# Patient Record
Sex: MALE | Race: WHITE | Employment: FULL TIME | URBAN - METROPOLITAN AREA
[De-identification: names, ages, dates, MRNs, and addresses within clinical notes are randomized per-mention and may not be internally consistent; named-entity substitution may affect disease eponyms.]

---

## 2018-04-16 ENCOUNTER — HOSPITAL ENCOUNTER (EMERGENCY)
Facility: CLINIC | Age: 53
Discharge: HOME | End: 2018-04-16
Attending: NURSE PRACTITIONER | Admitting: NURSE PRACTITIONER
Payer: COMMERCIAL

## 2018-04-16 VITALS
BODY MASS INDEX: 29.12 KG/M2 | OXYGEN SATURATION: 95 % | WEIGHT: 215 LBS | DIASTOLIC BLOOD PRESSURE: 92 MMHG | HEIGHT: 72 IN | SYSTOLIC BLOOD PRESSURE: 147 MMHG | HEART RATE: 68 BPM | RESPIRATION RATE: 20 BRPM | TEMPERATURE: 98 F

## 2018-04-16 DIAGNOSIS — M54.50 ACUTE BILATERAL LOW BACK PAIN WITHOUT SCIATICA: ICD-10-CM
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
## 2018-04-16 PROCEDURE — 99282 EMERGENCY DEPT VISIT SF MDM: CPT

## 2018-04-16 RX ORDER — METHOCARBAMOL 500 MG/1
1000 TABLET, FILM COATED ORAL 3 TIMES DAILY PRN
Qty: 30 TABLET | Refills: 1 | Status: SHIPPED | OUTPATIENT
Start: 2018-04-16 | End: 2019-07-26

## 2018-04-16 ASSESSMENT — ENCOUNTER SYMPTOMS
BACK PAIN: 1
NUMBNESS: 0
ABDOMINAL PAIN: 0
MYALGIAS: 1
SHORTNESS OF BREATH: 0
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
## 2018-04-16 NOTE — ED AVS SNAPSHOT
Emergency Department    [address] [place] [address]    Phone:  [phone]    Fax:  [phone]                                       Meghan Sam   MRN: [id number]    Department:   Emergency Department   Date of Visit:  4/16/2018           After Visit Summary Signature Page     I have received my discharge instructions, and my questions have been answered. I have discussed any challenges I see with this plan with the nurse or doctor.    ..........................................................................................................................................  Patient/Patient Representative Signature      ..........................................................................................................................................  Patient Representative Print Name and Relationship to Patient    ..................................................               ................................................  Date                                            Time    ..........................................................................................................................................  Reviewed by Signature/Title    ...................................................              ..............................................  Date                                                            Time
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
## 2019-07-26 ENCOUNTER — HOSPITAL ENCOUNTER (EMERGENCY)
Facility: CLINIC | Age: 54
Discharge: HOME | End: 2019-07-26
Attending: EMERGENCY MEDICINE | Admitting: EMERGENCY MEDICINE

## 2019-07-26 VITALS
DIASTOLIC BLOOD PRESSURE: 94 MMHG | OXYGEN SATURATION: 96 % | HEART RATE: 53 BPM | SYSTOLIC BLOOD PRESSURE: 141 MMHG | RESPIRATION RATE: 17 BRPM | TEMPERATURE: 97.9 F

## 2019-07-26 DIAGNOSIS — R55 NEAR SYNCOPE: ICD-10-CM

## 2019-07-26 LAB
ANION GAP SERPL CALCULATED.3IONS-SCNC: 6 MMOL/L (ref 3–14)
BASOPHILS # BLD AUTO: 0.1 10E9/L (ref 0–0.2)
BASOPHILS NFR BLD AUTO: 0.6 %
BUN SERPL-MCNC: 12 MG/DL (ref 7–30)
CALCIUM SERPL-MCNC: 8.8 MG/DL (ref 8.5–10.1)
CHLORIDE SERPL-SCNC: 109 MMOL/L (ref 94–109)
CO2 SERPL-SCNC: 27 MMOL/L (ref 20–32)
CREAT SERPL-MCNC: 0.94 MG/DL (ref 0.66–1.25)
DIFFERENTIAL METHOD BLD: ABNORMAL
EOSINOPHIL # BLD AUTO: 0.4 10E9/L (ref 0–0.7)
EOSINOPHIL NFR BLD AUTO: 3.4 %
ERYTHROCYTE [DISTWIDTH] IN BLOOD BY AUTOMATED COUNT: 11.8 % (ref 10–15)
GFR SERPL CREATININE-BSD FRML MDRD: >90 ML/MIN/{1.73_M2}
GLUCOSE SERPL-MCNC: 101 MG/DL (ref 70–99)
HCT VFR BLD AUTO: 48.2 % (ref 40–53)
HGB BLD-MCNC: 16.7 G/DL (ref 13.3–17.7)
IMM GRANULOCYTES # BLD: 0.1 10E9/L (ref 0–0.4)
IMM GRANULOCYTES NFR BLD: 0.4 %
LYMPHOCYTES # BLD AUTO: 2.9 10E9/L (ref 0.8–5.3)
LYMPHOCYTES NFR BLD AUTO: 22.9 %
MCH RBC QN AUTO: 32.4 PG (ref 26.5–33)
MCHC RBC AUTO-ENTMCNC: 34.6 G/DL (ref 31.5–36.5)
MCV RBC AUTO: 93 FL (ref 78–100)
MONOCYTES # BLD AUTO: 0.8 10E9/L (ref 0–1.3)
MONOCYTES NFR BLD AUTO: 6.2 %
NEUTROPHILS # BLD AUTO: 8.3 10E9/L (ref 1.6–8.3)
NEUTROPHILS NFR BLD AUTO: 66.5 %
NRBC # BLD AUTO: 0 10*3/UL
NRBC BLD AUTO-RTO: 0 /100
PLATELET # BLD AUTO: 219 10E9/L (ref 150–450)
POTASSIUM SERPL-SCNC: 3.4 MMOL/L (ref 3.4–5.3)
RBC # BLD AUTO: 5.16 10E12/L (ref 4.4–5.9)
SODIUM SERPL-SCNC: 142 MMOL/L (ref 133–144)
WBC # BLD AUTO: 12.5 10E9/L (ref 4–11)

## 2019-07-26 PROCEDURE — 80048 BASIC METABOLIC PNL TOTAL CA: CPT | Performed by: EMERGENCY MEDICINE

## 2019-07-26 PROCEDURE — 93005 ELECTROCARDIOGRAM TRACING: CPT

## 2019-07-26 PROCEDURE — 85025 COMPLETE CBC W/AUTO DIFF WBC: CPT | Performed by: EMERGENCY MEDICINE

## 2019-07-26 PROCEDURE — 96361 HYDRATE IV INFUSION ADD-ON: CPT

## 2019-07-26 PROCEDURE — 99284 EMERGENCY DEPT VISIT MOD MDM: CPT | Mod: 25

## 2019-07-26 PROCEDURE — 25800030 ZZH RX IP 258 OP 636: Performed by: EMERGENCY MEDICINE

## 2019-07-26 PROCEDURE — 96360 HYDRATION IV INFUSION INIT: CPT

## 2019-07-26 RX ADMIN — SODIUM CHLORIDE, POTASSIUM CHLORIDE, SODIUM LACTATE AND CALCIUM CHLORIDE 1000 ML: 600; 310; 30; 20 INJECTION, SOLUTION INTRAVENOUS at 18:44

## 2019-07-26 ASSESSMENT — ENCOUNTER SYMPTOMS
TREMORS: 1
NAUSEA: 0
VOMITING: 0
LIGHT-HEADEDNESS: 1
SHORTNESS OF BREATH: 0
PALPITATIONS: 0
HEADACHES: 0

## 2019-07-26 NOTE — LETTER
July 26, 2019      To Whom It May Concern:      Meghan Sam was seen in our Emergency Department today, 07/26/19.  I expect his condition to improve over the next day.  He may return to work when improved.    Sincerely,        Romeo Laura MD

## 2019-07-26 NOTE — ED NOTES
Bed: ED14  Expected date: 7/26/19  Expected time: 6:02 PM  Means of arrival: Ambulance  Comments:  Sejal Dang

## 2019-07-26 NOTE — ED TRIAGE NOTES
"Pt arrives via EMS for three near-syncopal episodes today.  Pt states \"he never did have LOC, but had to go down to his knees to prevent from passing out.\" Pt denies any SOB, chest pain, n/v, or any significant health hx. ABCs in-tact. VSS.  "

## 2019-07-26 NOTE — ED AVS SNAPSHOT
[facility] Emergency Department  [address]  [facility] [address]  Phone:  [phone]  Fax:  [phone]                                    Meghan Sam   MRN: [id number]    Department:  [facility] Emergency Department   Date of Visit:  7/26/2019           After Visit Summary Signature Page    I have received my discharge instructions, and my questions have been answered. I have discussed any challenges I see with this plan with the nurse or doctor.    ..........................................................................................................................................  Patient/Patient Representative Signature      ..........................................................................................................................................  Patient Representative Print Name and Relationship to Patient    ..................................................               ................................................  Date                                   Time    ..........................................................................................................................................  Reviewed by Signature/Title    ...................................................              ..............................................  Date                                               Time          22EPIC Rev 08/18

## 2019-07-26 NOTE — ED PROVIDER NOTES
"  History     Chief Complaint:  Near Syncope    HPI   Meghan Sam is a 54 year old male who presents to the emergency department for evaluation of near syncope. The patient reports he was at work today when he had 3 episodes of lightheadedness and near syncope spaced several minutes apart. He notes [place] and today was moving up and down ladders frequently. The patient indicates that following the 3rd episode of near syncope, he fell to his knees for several minutes, but he had no complete syncope, head trauma, or LOC. He denies any injuries or pain. He notified people at work regarding these symptoms and EMS was contacted. The patient denies any chemical or fume exposure today, as well as any chest pain, palpitations, shortness of breath, headache, vision changes, leg swelling, rash, nausea, or vomiting. He notes he has been mildly tremulous since the incident. He states he has had no similar episodes in the past. The patient remarks he has been mildly congested recently but has had no other recent illness. He now feels back to \"normal.\" No other preceding symptoms.     Allergies:  NKDA     Medications:    The patient is currently on no regular medications.     Past Medical History:    Palpitations     Past Surgical History:    The patient does not have any pertinent past surgical history.    Family History:    Diabetes  MI  ITP    Social History:  Presents alone.  Current every day smoker, 1 ppd.  Positive for alcohol use.   Marital Status:   [2]     Review of Systems   Eyes: Negative for visual disturbance.   Respiratory: Negative for shortness of breath.    Cardiovascular: Negative for chest pain, palpitations and leg swelling.   Gastrointestinal: Negative for nausea and vomiting.   Neurological: Positive for tremors and light-headedness. Negative for syncope and headaches.        Near syncope   All other systems reviewed and are negative.      Physical Exam "     Patient Vitals for the past 24 hrs:   BP Temp Temp src Pulse Heart Rate Resp SpO2   07/26/19 2000 (!) 140/100 -- -- 55 55 11 97 %   07/26/19 1945 (!) 135/93 -- -- 56 67 10 98 %   07/26/19 1935 -- 97.9  F (36.6  C) Oral -- -- -- --   07/26/19 1930 131/90 -- -- 53 55 17 93 %   07/26/19 1915 (!) 135/93 -- -- -- 71 18 95 %   07/26/19 1900 136/84 -- -- 63 63 16 94 %   07/26/19 1845 (!) 137/94 -- -- 72 73 11 96 %   07/26/19 1830 142/89 -- -- 76 73 19 96 %     Physical Exam  General: Well appearing, nontoxic. Resting comfortably  Head:  Scalp, face, and head appear normal  Eyes:  Pupils are equal, round, and reactive to light, EOMI, no nystagmus     Conjunctivae non-injected and sclerae white  ENT:    The external nose is normal    Pinnae are normal    The oropharynx is normal, mucous membranes moist    Uvula is in the midline  Neck:  Normal range of motion    There is no rigidity noted    Trachea is in the midline  CV:  Regular rate and rhythm     Normal S1/S2, no S3/S4    No murmur or rub. Radial pulses 2+ bilaterally   Resp:  Lungs are clear and equal bilaterally    There is no tachypnea    No increased work of breathing    No rales, wheezing, or rhonchi  GI:  Abdomen is soft, no rigidity or guarding    No distension, or mass    No tenderness or rebound tenderness   MS:  Normal muscular tone    Symmetric motor strength    No lower extremity edema  Skin:  No rash or acute skin lesions noted  Neuro: A&Ox3, GCS 15    CN II - XII intact    Speech clear, fluent, and normal    Strength 5/5 and symmetric in bilateral upper and lower extremities.    No pronator drift. No leg drift. SILT throughout.    No ankle clonus    FTN testing normal. No tremor.     Gait normal    No meningismus   Psych:  Normal affect.  Appropriate interactions.      Emergency Department Course   ECG:  Time: 1821  Vent. Rate 72 bpm. SC interval 162. QRS duration 92. QT/QTc 398/435. P-R-T axis 63 31 52.  Normal sinus rhythm.  Normal ECG.  Read time:  1829    Laboratory:  CBC: WBC: 12.5 (H), HGB: 16.7, PLT: 219  BMP: Glucose 101 (H), o/w WNL (Creatinine: 0.94)    Interventions:  1844 Lactated ringers bolus 1000 mL IV    Emergency Department Course:  Nursing notes and vitals reviewed. 1825 I performed an exam of the patient as documented above.     EKG obtained in the ED, see results above.     IV inserted. Medicine administered as documented above. Blood drawn. This was sent to the lab for further testing, results above.    2019 I rechecked the patient and discussed the results of his workup thus far.     Findings and plan explained to the Patient. Patient discharged home with instructions regarding supportive care, medications, and reasons to return. The importance of close follow-up was reviewed.     I personally reviewed the laboratory results with the Patient and answered all related questions prior to discharge.    Impression & Plan      Medical Decision Making:  Meghan Sam is a 54 year old male who presents for evaluation of near-syncope.  They did not have actual syncope.  The differential for near-syncope is broad and includes etiologies such as cardiac arrythmia, ACS, aortic stenosis, HOCM, PE, orthostatic hypotension, drugs, situational, carotid hypersensitivity, seizure, TIA, stroke, vasovagal.  There are no signs of a concerning etiology for near- syncope at this point.  In addition, there is no chest pain, no seizure activity or post-ictal period, no murmur, and no signs of orthostasis in the ED, no focal neurologic symptoms, and no complaints of concerning headache.  The workup in the ED is negative and the physical exam is re-assurring.  Supportive outpatient management is therefore indicated. Close PCP follow up recommended for any recurrent symptoms. Return precautions were discussed with patient. The patient's questions were answered and the patient was agreeable with discharge.     Diagnosis:    ICD-10-CM   1. Near syncope R55        Disposition:  discharged to home    I, Kyle Jacob, am serving as a scribe on 7/26/2019 at 6:17 PM to personally document services performed by Romeo Laura MD based on my observations and the provider's statements to me.     Kyle Jacob  7/26/2019   [facility] EMERGENCY DEPARTMENT       Romeo Laura MD  07/27/19 [id number]

## 2019-07-29 LAB — INTERPRETATION ECG - MUSE: NORMAL

## 2021-05-27 ENCOUNTER — HOSPITAL ENCOUNTER (EMERGENCY)
Facility: CLINIC | Age: 56
Discharge: HOME | End: 2021-05-27
Attending: EMERGENCY MEDICINE | Admitting: EMERGENCY MEDICINE
Payer: COMMERCIAL

## 2021-05-27 VITALS
HEIGHT: 70 IN | OXYGEN SATURATION: 96 % | HEART RATE: 51 BPM | BODY MASS INDEX: 25.77 KG/M2 | WEIGHT: 180 LBS | TEMPERATURE: 97.6 F | DIASTOLIC BLOOD PRESSURE: 90 MMHG | SYSTOLIC BLOOD PRESSURE: 127 MMHG | RESPIRATION RATE: 60 BRPM

## 2021-05-27 DIAGNOSIS — R07.9 CHEST PAIN, UNSPECIFIED TYPE: ICD-10-CM

## 2021-05-27 DIAGNOSIS — R00.2 PALPITATIONS: ICD-10-CM

## 2021-05-27 LAB
ANION GAP SERPL CALCULATED.3IONS-SCNC: 5 MMOL/L (ref 3–14)
BASOPHILS # BLD AUTO: 0.1 10E9/L (ref 0–0.2)
BASOPHILS NFR BLD AUTO: 1.2 %
BUN SERPL-MCNC: 17 MG/DL (ref 7–30)
CALCIUM SERPL-MCNC: 8.8 MG/DL (ref 8.5–10.1)
CHLORIDE SERPL-SCNC: 113 MMOL/L (ref 94–109)
CO2 SERPL-SCNC: 23 MMOL/L (ref 20–32)
CREAT SERPL-MCNC: 0.92 MG/DL (ref 0.66–1.25)
DIFFERENTIAL METHOD BLD: NORMAL
EOSINOPHIL # BLD AUTO: 0.3 10E9/L (ref 0–0.7)
EOSINOPHIL NFR BLD AUTO: 3.9 %
ERYTHROCYTE [DISTWIDTH] IN BLOOD BY AUTOMATED COUNT: 11.7 % (ref 10–15)
GFR SERPL CREATININE-BSD FRML MDRD: >90 ML/MIN/{1.73_M2}
GLUCOSE SERPL-MCNC: 115 MG/DL (ref 70–99)
HCT VFR BLD AUTO: 47.4 % (ref 40–53)
HGB BLD-MCNC: 16.6 G/DL (ref 13.3–17.7)
IMM GRANULOCYTES # BLD: 0 10E9/L (ref 0–0.4)
IMM GRANULOCYTES NFR BLD: 0.3 %
INTERPRETATION ECG - MUSE: NORMAL
LYMPHOCYTES # BLD AUTO: 2.8 10E9/L (ref 0.8–5.3)
LYMPHOCYTES NFR BLD AUTO: 31.4 %
MCH RBC QN AUTO: 31.5 PG (ref 26.5–33)
MCHC RBC AUTO-ENTMCNC: 35 G/DL (ref 31.5–36.5)
MCV RBC AUTO: 90 FL (ref 78–100)
MONOCYTES # BLD AUTO: 0.7 10E9/L (ref 0–1.3)
MONOCYTES NFR BLD AUTO: 8 %
NEUTROPHILS # BLD AUTO: 4.9 10E9/L (ref 1.6–8.3)
NEUTROPHILS NFR BLD AUTO: 55.2 %
NRBC # BLD AUTO: 0 10*3/UL
NRBC BLD AUTO-RTO: 0 /100
PLATELET # BLD AUTO: 250 10E9/L (ref 150–450)
POTASSIUM SERPL-SCNC: 4.1 MMOL/L (ref 3.4–5.3)
RBC # BLD AUTO: 5.27 10E12/L (ref 4.4–5.9)
SODIUM SERPL-SCNC: 141 MMOL/L (ref 133–144)
TROPONIN I SERPL-MCNC: <0.015 UG/L (ref 0–0.04)
TROPONIN I SERPL-MCNC: <0.015 UG/L (ref 0–0.04)
WBC # BLD AUTO: 8.8 10E9/L (ref 4–11)

## 2021-05-27 PROCEDURE — 85025 COMPLETE CBC W/AUTO DIFF WBC: CPT | Performed by: EMERGENCY MEDICINE

## 2021-05-27 PROCEDURE — 93005 ELECTROCARDIOGRAM TRACING: CPT

## 2021-05-27 PROCEDURE — 84484 ASSAY OF TROPONIN QUANT: CPT | Performed by: EMERGENCY MEDICINE

## 2021-05-27 PROCEDURE — 80048 BASIC METABOLIC PNL TOTAL CA: CPT | Performed by: EMERGENCY MEDICINE

## 2021-05-27 PROCEDURE — 99284 EMERGENCY DEPT VISIT MOD MDM: CPT | Mod: 25

## 2021-05-27 ASSESSMENT — ENCOUNTER SYMPTOMS
DIAPHORESIS: 0
TREMORS: 1
SHORTNESS OF BREATH: 1

## 2021-05-27 ASSESSMENT — MIFFLIN-ST. JEOR: SCORE: 1654.78

## 2021-05-27 NOTE — ED TRIAGE NOTES
"History of heart palpitations were \"quick\" but this one was a \"big thump\". Had 2 \"big thumps\" in a 30 minutes.  "

## 2021-05-27 NOTE — ED PROVIDER NOTES
"  History   Chief Complaint:  Palpitations       The history is provided by the patient.      Meghan GOODRICH Beager is a 56 year old male who presents with left sided chest pain and tremors. At 1320, the patient was driving to work and had two episodes of chest palpitations with associated tremors that lasted a couple minutes. He also mentioned he had a little episode while waiting in the ED lobby. He mentions mild shortness of breath as well. He notes chest palpitations in the past but this is a new feeling today. He denies any diaphoresis or swelling of the legs. He reports he is a smoker and generally has a poor diet. Of note, he had a major weight loss of about 40 pounds and travels to from [place] to [place]. Denies any surgery.     Review of Systems   Constitutional: Negative for diaphoresis.   Respiratory: Positive for shortness of breath.    Cardiovascular: Positive for chest pain. Negative for leg swelling.   Neurological: Positive for tremors.   All other systems reviewed and are negative.      Allergies:  The patient has no known allergies.     Medications:  The patient is currently on no regular medications.    Past Medical History:    Palpitations    Past Surgical History:    The patient denies past surgical history.     Family History:    Mother: Blood disease  Father: Diabetes, MI    Social History:  The patient presents alone.   He is a smoker.  Alcohol use: Negative    Physical Exam     Patient Vitals for the past 24 hrs:   BP Temp Temp src Pulse Resp SpO2 Height Weight   05/27/21 1830 (!) 127/90 -- -- 51 (!) 60 96 % -- --   05/27/21 1800 (!) 127/94 -- -- 50 9 96 % -- --   05/27/21 1730 (!) 127/98 -- -- (!) 49 (!) 0 96 % -- --   05/27/21 1417 138/83 97.6  F (36.4  C) Temporal 60 18 97 % 1.765 m (5' 9.5\") 81.6 kg (180 lb)       Physical Exam  Eyes:  The pupils are equal and round    Conjunctivae and sclerae are normal  ENT:    The nose is normal    Pinnae are normal  CV:  Regular rate and rhythm "     No edema  Resp:  Lungs are clear    Non-labored    No rales    No wheezing   GI:  Abdomen is soft, there is no rigidity    No distension    No rebound tenderness   MS:  Normal muscular tone    No asymmetric leg swelling  Skin:  No rash or acute skin lesions noted  Neuro:   Awake, alert.      Speech is normal and fluent.    Face is symmetric.     Moves all extremities    Emergency Department Course   ECG  ECG taken at 1424, ECG read at 1730  Sinus bradycardia   Rate 53 bpm. NC interval 148 ms. QRS duration 86 ms. QT/QTc 418/392 ms. P-R-T axes 50 45 44.     Laboratory:  CBC: WBC 8.8, HGB 16.6,      BMP: Chloride: 113 (H), Glucose: 115 (H) o/w WNL (Creatinine 0.92)     Troponin (Collected 1424): <0.015    Troponin (Collected 1812): <0.015    Emergency Department Course:    Reviewed:  I reviewed nursing notes, vitals, past medical history and care everywhere    Assessments:  1730 I obtained history and examined the patient as noted above.     1927 Updated and rechecked the patient.     Disposition:  The patient was discharged to home.     Impression & Plan     Medical Decision Making:  Meghan Sam is a 56 year old male who presents the emergency department with chest pain and palpitations.  He notes that this started while he was driving today and lasted for a couple of minutes.  He also had a slight episode of palpitations without chest pain while waiting in the lobby.  He has had palpitations many times in the past but they have never been captured on any EKG or monitor.  He does report that he has previously done a Holter.  Here his EKG shows a sinus bradycardia without any acute ischemic changes.  His CBC and BMP are normal.  Troponin testing was performed and was undetectable.  Given the duration of time between onset of his symptoms and his presentation, a second troponin was obtained and found also to be undetectable.  He has no known cardiac disease.  No family history of heart disease.  Here his  oxygenation is normal.  He has no shortness of breath currently although he did have a brief episode while he was having the palpitations.  I doubt pulmonary embolism based on his symptoms without any leg swelling or other risk factors.  He has a low heart score.  He felt comfortable with plan for discharge.  Encouraged him to follow-up with his PCP.  He was given an order for follow-up for outpatient cardiac monitoring as well as cardiology follow-up.  He is to return with any new or worrisome symptoms.      Diagnosis:    ICD-10-CM    1. Palpitations  R00.2 Follow-Up with Cardiologist     Leadless EKG Monitor 3 to 14 Days   2. Chest pain, unspecified type  R07.9 Follow-Up with Cardiologist     Leadless EKG Monitor 3 to 14 Days       Scribe Disclosure:  I, Ant Wallace, am serving as a scribe at 5:25 PM on 5/27/2021 to document services personally performed by Jesu Snowden MD based on my observations and the provider's statements to me.     I, Hebert Rivera, am serving as a scribe  at 9:43 PM on 5/27/2021 to document services personally performed by Jesu Snowden MD based on my observations and the provider's statements to me.            Jesu Snowden MD  05/27/21 [id number]

## 2021-06-07 ENCOUNTER — HOSPITAL ENCOUNTER (OUTPATIENT)
Dept: CARDIOLOGY | Facility: CLINIC | Age: 56
Discharge: HOME | End: 2021-06-07
Attending: EMERGENCY MEDICINE | Admitting: EMERGENCY MEDICINE
Payer: COMMERCIAL

## 2021-06-07 DIAGNOSIS — R00.2 PALPITATIONS: ICD-10-CM

## 2021-06-07 DIAGNOSIS — R07.9 CHEST PAIN, UNSPECIFIED TYPE: ICD-10-CM

## 2021-06-07 PROCEDURE — 93242 EXT ECG>48HR<7D RECORDING: CPT

## 2021-06-07 PROCEDURE — 93244 EXT ECG>48HR<7D REV&INTERPJ: CPT | Performed by: INTERNAL MEDICINE

## 2023-05-03 ENCOUNTER — OFFICE VISIT (OUTPATIENT)
Dept: FAMILY MEDICINE | Facility: CLINIC | Age: 58
End: 2023-05-03
Payer: COMMERCIAL

## 2023-05-03 ENCOUNTER — ANCILLARY PROCEDURE (OUTPATIENT)
Dept: GENERAL RADIOLOGY | Facility: CLINIC | Age: 58
End: 2023-05-03
Payer: COMMERCIAL

## 2023-05-03 ENCOUNTER — LAB (OUTPATIENT)
Dept: FAMILY MEDICINE | Facility: CLINIC | Age: 58
End: 2023-05-03

## 2023-05-03 VITALS
WEIGHT: 212.1 LBS | RESPIRATION RATE: 16 BRPM | BODY MASS INDEX: 30.37 KG/M2 | HEART RATE: 64 BPM | HEIGHT: 70 IN | OXYGEN SATURATION: 95 % | DIASTOLIC BLOOD PRESSURE: 84 MMHG | SYSTOLIC BLOOD PRESSURE: 138 MMHG | TEMPERATURE: 98.4 F

## 2023-05-03 DIAGNOSIS — Z11.59 NEED FOR HEPATITIS C SCREENING TEST: ICD-10-CM

## 2023-05-03 DIAGNOSIS — Z12.11 SCREEN FOR COLON CANCER: ICD-10-CM

## 2023-05-03 DIAGNOSIS — M25.561 ACUTE PAIN OF RIGHT KNEE: ICD-10-CM

## 2023-05-03 DIAGNOSIS — I10 HYPERTENSION, UNSPECIFIED TYPE: ICD-10-CM

## 2023-05-03 DIAGNOSIS — Z13.220 SCREENING FOR HYPERLIPIDEMIA: ICD-10-CM

## 2023-05-03 DIAGNOSIS — Z11.4 SCREENING FOR HIV (HUMAN IMMUNODEFICIENCY VIRUS): ICD-10-CM

## 2023-05-03 DIAGNOSIS — M25.561 ACUTE PAIN OF RIGHT KNEE: Primary | ICD-10-CM

## 2023-05-03 LAB
ANION GAP SERPL CALCULATED.3IONS-SCNC: 12 MMOL/L (ref 7–15)
BUN SERPL-MCNC: 19.4 MG/DL (ref 6–20)
CALCIUM SERPL-MCNC: 9.5 MG/DL (ref 8.6–10)
CHLORIDE SERPL-SCNC: 107 MMOL/L (ref 98–107)
CHOLEST SERPL-MCNC: 192 MG/DL
CREAT SERPL-MCNC: 0.96 MG/DL (ref 0.67–1.17)
DEPRECATED HCO3 PLAS-SCNC: 23 MMOL/L (ref 22–29)
GFR SERPL CREATININE-BSD FRML MDRD: >90 ML/MIN/1.73M2
GLUCOSE SERPL-MCNC: 102 MG/DL (ref 70–99)
HBA1C MFR BLD: 6 % (ref 0–5.6)
HCV AB SERPL QL IA: NONREACTIVE
HDLC SERPL-MCNC: 39 MG/DL
HIV 1+2 AB+HIV1 P24 AG SERPL QL IA: NONREACTIVE
LDLC SERPL CALC-MCNC: 124 MG/DL
NONHDLC SERPL-MCNC: 153 MG/DL
POTASSIUM SERPL-SCNC: 4.5 MMOL/L (ref 3.4–5.3)
SODIUM SERPL-SCNC: 142 MMOL/L (ref 136–145)
TRIGL SERPL-MCNC: 146 MG/DL

## 2023-05-03 PROCEDURE — 73562 X-RAY EXAM OF KNEE 3: CPT | Mod: TC | Performed by: RADIOLOGY

## 2023-05-03 PROCEDURE — 86803 HEPATITIS C AB TEST: CPT

## 2023-05-03 PROCEDURE — 99203 OFFICE O/P NEW LOW 30 MIN: CPT

## 2023-05-03 PROCEDURE — 87389 HIV-1 AG W/HIV-1&-2 AB AG IA: CPT

## 2023-05-03 PROCEDURE — 36415 COLL VENOUS BLD VENIPUNCTURE: CPT

## 2023-05-03 PROCEDURE — 80048 BASIC METABOLIC PNL TOTAL CA: CPT

## 2023-05-03 PROCEDURE — 83036 HEMOGLOBIN GLYCOSYLATED A1C: CPT

## 2023-05-03 PROCEDURE — 80061 LIPID PANEL: CPT

## 2023-05-03 NOTE — PROGRESS NOTES
"  Assessment & Plan     Acute pain of right knee  Pain for 3 weeks since he was unable to \"pop\" his knee as usual.  He has difficulty walking due to pain today and is set up with crutches.  X-ray of right knee and referral to Ortho for further management.  Discussed potential for physical therapy once cleared by x-ray.  Also discussed future potential for joint injections and possible surgical management.  Patient with no known history of arthritis, no previous history with this knee.  Has been wearing a knee brace.  Advised to rest and use ice as well.  Pain elicited with internal rotation of right foot.  No pain with flexion and extension passively, no point tenderness.  No obvious signs of deformity no swelling.  - XR Knee Right 3 Views; Future  - Orthopedic  Referral; Future  - CRUTCHES, UNDERARM WOOD    Hypertension, unspecified type  No known history of hypertension, blood pressure elevated in clinic today.  Order placed for home blood pressure cuff and if not covered patient to buy one OTC.  Patient instructed she will check her blood pressure and if more than 3 elevated blood pressures to return to clinic.  Discussed diet and exercise modifications, significant other reports he is 30 pounds overweight and often has issues with high blood pressure when he is overweight.  - Basic metabolic panel  (Ca, Cl, CO2, Creat, Gluc, K, Na, BUN); Future  - Home Blood Pressure Monitor Order for DME - ONLY FOR DME  - Basic metabolic panel  (Ca, Cl, CO2, Creat, Gluc, K, Na, BUN)    Screen for colon cancer  Declines colonoscopy but is amenable to Cologuard screening.  Discussed follow-up needed should this test to be positive.  Patient informed he will be notified of positive results via telephone and negative results via letter.  - COLOGUARD(EXACT SCIENCES); Future    Screening for HIV (human immunodeficiency virus)  Amenable to one-time HIV screening.  No known risk factors.  - HIV Antigen Antibody Combo; " "Future  - Hemoglobin A1c; Future  - HIV Antigen Antibody Combo  - Hemoglobin A1c    Need for hepatitis C screening test  Amenable to one-time hepatitis C screening.  No known risk factors.  - Hepatitis C Screen Reflex to HCV RNA Quant and Genotype; Future  - Hepatitis C Screen Reflex to HCV RNA Quant and Genotype    Screening for hyperlipidemia  No known personal or familial history of hyperlipidemia.  No known previous screening.  - Lipid panel reflex to direct LDL Non-fasting; Future  - Lipid panel reflex to direct LDL Non-fasting    Father did have diabetes, patient has not been told has diabetes in the past.  He is amenable to screening for high blood sugar today.  He has eaten and is not fasting.  Order placed for hemoglobin A1c.  We discussed diet and exercise should he be found to have diabetes and additional measures that may be recommended should he be found to have diabetes.             Nicotine/Tobacco Cessation:  He reports that he has been smoking cigarettes. He has a 28.00 pack-year smoking history. He has never used smokeless tobacco.  Nicotine/Tobacco Cessation Plan:   Information offered: Patient not interested at this time      BMI:   Estimated body mass index is 30.87 kg/m  as calculated from the following:    Height as of this encounter: 1.765 m (5' 9.5\").    Weight as of this encounter: 96.2 kg (212 lb 1.6 oz).   Weight management plan: Discussed healthy diet and exercise guidelines        NAVDEEP Tao CNP [facility]    Rajat Christianson is a 58 year old, presenting for the following health issues:  Establish Care and Knee Pain (Right knee x 3 weeks. No known injury, but had the sensation like knee needed to be popped.)        5/3/2023    10:08 AM   Additional Questions   Roomed by Rose   Accompanied by Girlfriend Constanza Christianson is a 58-year-old male ambulatory to clinic with a limp accompanied by his significant other.  He reports 3 weeks ago was " "unable to pop knee like he is usually able to. Gets pain with walking and if he sits too long. Has been using a brace, with mild relief. Ibuprofen with mild relief. No known history of arthritis. CBD oil does seem to help. First step in the am is very painful.  He reports pain radiates to left foot.        Knee Pain    History of Present Illness       Reason for visit:  Knee pain  Symptom onset:  3-4 weeks ago    He eats 0-1 servings of fruits and vegetables daily.He consumes 3 sweetened beverage(s) daily.He exercises with enough effort to increase his heart rate 9 or less minutes per day.  He exercises with enough effort to increase his heart rate 4 days per week.   He is taking medications regularly.       Pain History:  When did you first notice your pain? 3 weeks ago   Have you seen anyone else for your pain? No  How has your pain affected your ability to work? Yes  Where in your body do you have pain? Musculoskeletal problem/pain  Onset/Duration: 6 weeks  Description  Location: knee - right  Joint Swelling: YES  Redness: No  Pain: YES  Warmth: No  Intensity:  Moderate to severe  Progression of Symptoms:  worsening  Accompanying signs and symptoms:   Fevers: No  Numbness/tingling/weakness: YES  History  Trauma to the area: No  Recent illness:  No  Previous similar problem: No  Previous evaluation:  No  Precipitating or alleviating factors:  Aggravating factors include: standing, walking, climbing stairs and overuse  Therapies tried and outcome: Ibuprofen, Knee brace and CBD            Review of Systems   Constitutional, HEENT, cardiovascular, pulmonary, gi and gu systems are negative, except as otherwise noted.      Objective    BP (!) 146/66 (BP Location: Right arm, Patient Position: Sitting, Cuff Size: Adult Large)   Pulse 64   Temp 98.4  F (36.9  C) (Oral)   Resp 16   Ht 1.765 m (5' 9.5\")   Wt 96.2 kg (212 lb 1.6 oz)   SpO2 95%   BMI 30.87 kg/m    Body mass index is 30.87 kg/m .  Physical Exam "   GENERAL: healthy, alert and no distress  NECK: no adenopathy, no asymmetry, masses, or scars and thyroid normal to palpation  RESP: lungs clear to auscultation - no rales, rhonchi or wheezes  CV: regular rate and rhythm, normal S1 S2, no S3 or S4, no murmur, click or rub, no peripheral edema and peripheral pulses strong  ABDOMEN: soft, nontender, no hepatosplenomegaly, no masses and bowel sounds normal  MS: no gross musculoskeletal defects noted, no edema  MS: normal muscle tone, no edema, peripheral pulses normal and RLE exam shows no deformities and no point tenderness.  No pain with anterior and posterior drawer test.  Knee pain is elicited with anterior rotation of right foot.  Passive flexion and extension of knee do not elicit pain.

## 2023-05-05 ENCOUNTER — TELEPHONE (OUTPATIENT)
Dept: FAMILY MEDICINE | Facility: CLINIC | Age: 58
End: 2023-05-05
Payer: COMMERCIAL

## 2023-05-05 DIAGNOSIS — R73.03 PREDIABETES: Primary | ICD-10-CM

## 2023-05-05 NOTE — TELEPHONE ENCOUNTER
Order/Referral Request    Who is requesting: Patient     Orders being requested: Has some questions about his referral from last office visit on 5/3 and the results of his Xray    Reason service is needed/diagnosis: Right Knee Pain    Has this been discussed with Provider: Yes    Does patient have a preference on a Group/Provider/Facility? No    Okay to leave a detailed message?: Yes at home number on file:    Telephone Information:   Mobile [phone]

## 2023-05-05 NOTE — TELEPHONE ENCOUNTER
Patient called to relay results of normal knee xray. Patient informed that his lab work shows he has prediabetes.  Other lab work reviewed with patient and is within expected range and is amenable to receiving diabetes education and nutrition counseling.  Order placed.    Patient also given number to call for Ortho referral as he missed a call from someone in healthcare and does not have the after visit summary was given to call back.    NAVDEEP Tao CNP on 5/5/2023 at 5:04 PM

## 2024-07-29 RX ORDER — LOSARTAN POTASSIUM 25 MG/1
1 TABLET ORAL DAILY
COMMUNITY
Start: 2024-07-25 | End: 2024-07-30

## 2024-07-29 RX ORDER — OXYCODONE HYDROCHLORIDE 5 MG/1
2.5-5 TABLET ORAL EVERY 4 HOURS PRN
COMMUNITY
Start: 2024-07-25 | End: 2024-07-30

## 2024-07-29 RX ORDER — ACETAMINOPHEN 500 MG
1000 TABLET ORAL EVERY 6 HOURS PRN
COMMUNITY
Start: 2024-07-25

## 2024-07-30 ENCOUNTER — ORDERS ONLY (AUTO-RELEASED) (OUTPATIENT)
Dept: FAMILY MEDICINE | Facility: CLINIC | Age: 59
End: 2024-07-30

## 2024-07-30 ENCOUNTER — OFFICE VISIT (OUTPATIENT)
Dept: FAMILY MEDICINE | Facility: CLINIC | Age: 59
End: 2024-07-30
Payer: COMMERCIAL

## 2024-07-30 VITALS
HEIGHT: 70 IN | SYSTOLIC BLOOD PRESSURE: 132 MMHG | TEMPERATURE: 98.3 F | BODY MASS INDEX: 28.55 KG/M2 | HEART RATE: 61 BPM | OXYGEN SATURATION: 96 % | DIASTOLIC BLOOD PRESSURE: 76 MMHG | RESPIRATION RATE: 16 BRPM | WEIGHT: 199.4 LBS

## 2024-07-30 DIAGNOSIS — Z12.11 SCREEN FOR COLON CANCER: ICD-10-CM

## 2024-07-30 DIAGNOSIS — Z95.0 CARDIAC PACEMAKER IN SITU: ICD-10-CM

## 2024-07-30 DIAGNOSIS — Z71.6 ENCOUNTER FOR TOBACCO USE CESSATION COUNSELING: ICD-10-CM

## 2024-07-30 DIAGNOSIS — Z12.11 SCREEN FOR COLON CANCER: Primary | ICD-10-CM

## 2024-07-30 DIAGNOSIS — I10 HYPERTENSION, UNSPECIFIED TYPE: ICD-10-CM

## 2024-07-30 PROCEDURE — G2211 COMPLEX E/M VISIT ADD ON: HCPCS | Performed by: NURSE PRACTITIONER

## 2024-07-30 PROCEDURE — 99213 OFFICE O/P EST LOW 20 MIN: CPT | Performed by: NURSE PRACTITIONER

## 2024-07-30 RX ORDER — VARENICLINE TARTRATE 0.5 (11)-1
KIT ORAL
Qty: 53 TABLET | Refills: 0 | Status: SHIPPED | OUTPATIENT
Start: 2024-07-30

## 2024-07-30 RX ORDER — LOSARTAN POTASSIUM 25 MG/1
25 TABLET ORAL DAILY
Qty: 90 TABLET | Refills: 0 | Status: SHIPPED | OUTPATIENT
Start: 2024-07-30

## 2024-07-30 NOTE — PROGRESS NOTES
"  Assessment & Plan     (Z12.11) Screen for colon cancer  (primary encounter diagnosis)  Comment:   Plan: COLOGUARD(EXACT SCIENCES)            (I10) Hypertension, unspecified type  Comment:   Plan: losartan (COZAAR) 25 MG tablet        Controlled, med refilled    (Z71.6) Encounter for tobacco use cessation counseling  Comment:   Plan: varenicline (CHANTIX TIM) 0.5 MG X 11 & 1 MG X         42 tablet            (Z95.0) Cardiac pacemaker in situ  Comment:   Plan:   The longitudinal plan of care for the diagnosis(es)/condition(s) as documented were addressed during this visit. Due to the added complexity in care, I will continue to support Meghan in the subsequent management and with ongoing continuity of care.          MED REC REQUIRED  Post Medication Reconciliation Status: discharge medications reconciled and changed, per note/orders  Nicotine/Tobacco Cessation  He reports that he has been smoking cigarettes. He has a 28 pack-year smoking history. He has never used smokeless tobacco.  Nicotine/Tobacco Cessation Plan  Information offered: Patient not interested at this time  Pharmacotherapies : varenicline (Chantix)        BMI  Estimated body mass index is 29.02 kg/m  as calculated from the following:    Height as of this encounter: 1.765 m (5' 9.5\").    Weight as of this encounter: 90.4 kg (199 lb 6.4 oz).             Rajat Christianson is a 59 year old, presenting for the following health issues: patient is here for follow up visit after hospital stay, patient was given pacemaker, has been feeling ok since being home    [facility] notes reviewed reveal:  Echocardiogram un remarkable  Troponin negative  EKG sinus satish prior to pacer placement    Smoker since age 20 year, would be willing to consider Chantix, educated on use/risk/benefits    Willing to try cologuard for screening    Hospital F/U        7/30/2024     8:57 AM   Additional Questions   Roomed by fabian velazquez   Accompanied by [facility] " "Follow-up Visit:    Hospital/Nursing Home/IP Rehab Facility:  [facility]  Date of Admission: 07/22/2024  Date of Discharge: 07/25/2024  Reason(s) for Admission: bradycardia, was just not feeling well  Was the patient in the ICU or did the patient experience delirium during hospitalization?  No  Do you have any other stressors you would like to discuss with your provider? No    Problems taking medications regularly:  None  Medication changes since discharge: started in Losartan and Acetaminophen  Problems adhering to non-medication therapy:  None    Summary of hospitalization:  CareEverywhere information obtained and reviewed  Diagnostic Tests/Treatments reviewed.  Follow up needed: see arediology tomorrow for dressing removal  Has lexiscan set up with [facility] in 6 weeks  Other Healthcare Providers Involved in Patient s Care:         None  Update since discharge: improved.         Plan of care communicated with patient and family                     Objective    /76 (BP Location: Right arm, Patient Position: Sitting)   Pulse 61   Temp 98.3  F (36.8  C)   Resp 16   Ht 1.765 m (5' 9.5\")   Wt 90.4 kg (199 lb 6.4 oz)   SpO2 96%   BMI 29.02 kg/m    Body mass index is 29.02 kg/m .  Physical Exam   GENERAL: alert and no distress  NECK: no adenopathy, no asymmetry, masses, or scars  RESP: lungs clear to auscultation - no rales, rhonchi or wheezes  CV: regular rate and rhythm, normal S1 S2, no S3 or S4, no murmur, click or rub, no peripheral edema  MS: no gross musculoskeletal defects noted, no edema  Dressing intact left chest wall, no drainage            Signed Electronically by: Piper Khan NP    "

## 2024-07-30 NOTE — LETTER
July 30, 2024      Meghan Sam   [facility] [address]        To Whom It May Concern:    Meghan Sam was seen in our clinic. He may return to work with the following: can not lift left arm over his head for 4 more weeks, no heavy lifting for that time frame as well.      Sincerely,      Piper Khan

## 2024-12-10 DIAGNOSIS — I10 HYPERTENSION, UNSPECIFIED TYPE: ICD-10-CM

## 2024-12-11 RX ORDER — LOSARTAN POTASSIUM 25 MG/1
25 TABLET ORAL DAILY
Qty: 90 TABLET | Refills: 0 | Status: SHIPPED | OUTPATIENT
Start: 2024-12-11

## 2025-04-08 DIAGNOSIS — I10 HYPERTENSION, UNSPECIFIED TYPE: ICD-10-CM

## 2025-04-08 RX ORDER — LOSARTAN POTASSIUM 25 MG/1
25 TABLET ORAL DAILY
Qty: 90 TABLET | Refills: 0 | Status: SHIPPED | OUTPATIENT
Start: 2025-04-08

## 2025-04-24 ENCOUNTER — ORDERS ONLY (AUTO-RELEASED) (OUTPATIENT)
Dept: FAMILY MEDICINE | Facility: CLINIC | Age: 60
End: 2025-04-24

## 2025-04-24 ENCOUNTER — OFFICE VISIT (OUTPATIENT)
Dept: FAMILY MEDICINE | Facility: CLINIC | Age: 60
End: 2025-04-24
Payer: COMMERCIAL

## 2025-04-24 VITALS
SYSTOLIC BLOOD PRESSURE: 128 MMHG | HEART RATE: 63 BPM | HEIGHT: 70 IN | DIASTOLIC BLOOD PRESSURE: 80 MMHG | OXYGEN SATURATION: 98 % | WEIGHT: 206 LBS | BODY MASS INDEX: 29.49 KG/M2 | TEMPERATURE: 97.5 F | RESPIRATION RATE: 20 BRPM

## 2025-04-24 DIAGNOSIS — Z00.00 ROUTINE GENERAL MEDICAL EXAMINATION AT A HEALTH CARE FACILITY: Primary | ICD-10-CM

## 2025-04-24 DIAGNOSIS — Z12.11 SCREEN FOR COLON CANCER: ICD-10-CM

## 2025-04-24 DIAGNOSIS — Z95.0 CARDIAC PACEMAKER IN SITU: ICD-10-CM

## 2025-04-24 DIAGNOSIS — I10 HYPERTENSION, UNSPECIFIED TYPE: ICD-10-CM

## 2025-04-24 DIAGNOSIS — Z13.1 SCREENING FOR DIABETES MELLITUS: ICD-10-CM

## 2025-04-24 LAB
ANION GAP SERPL CALCULATED.3IONS-SCNC: 12 MMOL/L (ref 7–15)
BUN SERPL-MCNC: 16.3 MG/DL (ref 6–20)
CALCIUM SERPL-MCNC: 9.3 MG/DL (ref 8.8–10.4)
CHLORIDE SERPL-SCNC: 106 MMOL/L (ref 98–107)
CREAT SERPL-MCNC: 0.95 MG/DL (ref 0.67–1.17)
EGFRCR SERPLBLD CKD-EPI 2021: >90 ML/MIN/1.73M2
EST. AVERAGE GLUCOSE BLD GHB EST-MCNC: 128 MG/DL
GLUCOSE SERPL-MCNC: 105 MG/DL (ref 70–99)
HBA1C MFR BLD: 6.1 % (ref 0–5.6)
HCO3 SERPL-SCNC: 24 MMOL/L (ref 22–29)
POTASSIUM SERPL-SCNC: 4.5 MMOL/L (ref 3.4–5.3)
SODIUM SERPL-SCNC: 142 MMOL/L (ref 135–145)

## 2025-04-24 PROCEDURE — 80048 BASIC METABOLIC PNL TOTAL CA: CPT | Performed by: NURSE PRACTITIONER

## 2025-04-24 RX ORDER — LOSARTAN POTASSIUM 25 MG/1
25 TABLET ORAL DAILY
Qty: 90 TABLET | Refills: 3 | Status: SHIPPED | OUTPATIENT
Start: 2025-04-24

## 2025-04-24 SDOH — HEALTH STABILITY: PHYSICAL HEALTH: ON AVERAGE, HOW MANY MINUTES DO YOU ENGAGE IN EXERCISE AT THIS LEVEL?: 0 MIN

## 2025-04-24 SDOH — HEALTH STABILITY: PHYSICAL HEALTH: ON AVERAGE, HOW MANY DAYS PER WEEK DO YOU ENGAGE IN MODERATE TO STRENUOUS EXERCISE (LIKE A BRISK WALK)?: 1 DAY

## 2025-04-24 ASSESSMENT — SOCIAL DETERMINANTS OF HEALTH (SDOH): HOW OFTEN DO YOU GET TOGETHER WITH FRIENDS OR RELATIVES?: NEVER

## 2025-04-24 NOTE — PATIENT INSTRUCTIONS
Patient Education   Preventive Care Advice   This is general advice given by our system to help you stay healthy. However, your care team may have specific advice just for you. Please talk to your care team about your preventive care needs.  Nutrition  Eat 5 or more servings of fruits and vegetables each day.  Try wheat bread, brown rice and whole grain pasta (instead of white bread, rice, and pasta).  Get enough calcium and vitamin D. Check the label on foods and aim for 100% of the RDA (recommended daily allowance).  Lifestyle  Exercise at least 150 minutes each week  (30 minutes a day, 5 days a week).  Do muscle strengthening activities 2 days a week. These help control your weight and prevent disease.  No smoking.  Wear sunscreen to prevent skin cancer.  Have a dental exam and cleaning every 6 months.  Yearly exams  See your health care team every year to talk about:  Any changes in your health.  Any medicines your care team has prescribed.  Preventive care, family planning, and ways to prevent chronic diseases.  Shots (vaccines)   HPV shots (up to age 26), if you've never had them before.  Hepatitis B shots (up to age 59), if you've never had them before.  COVID-19 shot: Get this shot when it's due.  Flu shot: Get a flu shot every year.  Tetanus shot: Get a tetanus shot every 10 years.  Pneumococcal, hepatitis A, and RSV shots: Ask your care team if you need these based on your risk.  Shingles shot (for age 50 and up)  General health tests  Diabetes screening:  Starting at age 35, Get screened for diabetes at least every 3 years.  If you are younger than age 35, ask your care team if you should be screened for diabetes.  Cholesterol test: At age 39, start having a cholesterol test every 5 years, or more often if advised.  Bone density scan (DEXA): At age 50, ask your care team if you should have this scan for osteoporosis (brittle bones).  Hepatitis C: Get tested at least once in your life.  STIs (sexually  transmitted infections)  Before age 24: Ask your care team if you should be screened for STIs.  After age 24: Get screened for STIs if you're at risk. You are at risk for STIs (including HIV) if:  You are sexually active with more than one person.  You don't use condoms every time.  You or a partner was diagnosed with a sexually transmitted infection.  If you are at risk for HIV, ask about PrEP medicine to prevent HIV.  Get tested for HIV at least once in your life, whether you are at risk for HIV or not.  Cancer screening tests  Cervical cancer screening: If you have a cervix, begin getting regular cervical cancer screening tests starting at age 21.  Breast cancer scan (mammogram): If you've ever had breasts, begin having regular mammograms starting at age 40. This is a scan to check for breast cancer.  Colon cancer screening: It is important to start screening for colon cancer at age 45.  Have a colonoscopy test every 10 years (or more often if you're at risk) Or, ask your provider about stool tests like a FIT test every year or Cologuard test every 3 years.  To learn more about your testing options, visit:   .  For help making a decision, visit:   https://bit.ly/ft28393.  Prostate cancer screening test: If you have a prostate, ask your care team if a prostate cancer screening test (PSA) at age 55 is right for you.  Lung cancer screening: If you are a current or former smoker ages 50 to 80, ask your care team if ongoing lung cancer screenings are right for you.  For informational purposes only. Not to replace the advice of your health care provider. Copyright   2023 [facility]. All rights reserved. Clinically reviewed by the [facility] Transitions Program. [employer] 246622 - REV 01/24.  Relationships for Good Health  Relationships are important for our health and happiness. Social isolation, loneliness and lack of support are bad for your health. Studies show that loneliness can harm health  and limit your life span as much as high blood pressure and smoking.   Take some time to reflect on your relationships. Then answer these questions:  Are there people in your life that cause you stress or drain your energy? What can you do to set limits?  ________________________________________________________________________________________________________________________________________________________________________________________________________________________________________________________________________________________________________________________________________________  Who do you enjoy spending time with? Who can you go to for support?  ________________________________________________________________________________________________________________________________________________________________________________________________________________________________________________________________________________________________________________________________________________  What can you do to improve your relationships with others?  __________________________________________________________________________________________________________________________________________________________________________________________________________________  ______________________________________________________________________________________________________________________________  What do you like most about your relationships with others?  ________________________________________________________________________________________________________________________________________________________________________________________________________________________________________________________________________________________________________________________________________________  My goal: ______________________________________________________________________  I will:  ______________________________________________________________________________________________________________________________________________________________________________________________    For informational purposes only. Not to replace the advice of your health care provider. Copyright   2018 [facility]. All rights reserved. Clinically reviewed by Bariatric Health  Team. [employer] 506255 - Rev 06/24.  Learning About Stress  What is stress?     Stress is your body's response to a hard situation. Your body can have a physical, emotional, or mental response. Stress is a fact of life for most people, and it affects everyone differently. What causes stress for you may not be stressful for someone else.  A lot of things can cause stress. You may feel stress when you go on a job interview, take a test, or run a race. This kind of short-term stress is normal and even useful. It can help you if you need to work hard or react quickly. For example, stress can help you finish an important job on time.  Long-term stress is caused by ongoing stressful situations or events. Examples of long-term stress include long-term health problems, ongoing problems at work, or conflicts in your family. Long-term stress can harm your health.  How does stress affect your health?  When you are stressed, your body responds as though you are in danger. It makes hormones that speed up your heart, make you breathe faster, and give you a burst of energy. This is called the fight-or-flight stress response. If the stress is over quickly, your body goes back to normal and no harm is done.  But if stress happens too often or lasts too long, it can have bad effects. Long-term stress can make you more likely to get sick, and it can make symptoms of some diseases worse. If you tense up when you are stressed, you may develop neck, shoulder, or low back pain. Stress is linked to high blood pressure and heart disease.  Stress also  harms your emotional health. It can make you tovar, tense, or depressed. Your relationships may suffer, and you may not do well at work or school.  What can you do to manage stress?  You can try these things to help manage stress:   Do something active. Exercise or activity can help reduce stress. Walking is a great way to get started. Even everyday activities such as housecleaning or yard work can help.  Try yoga or raam chi. These techniques combine exercise and meditation. You may need some training at first to learn them.  Do something you enjoy. For example, listen to music or go to a movie. Practice your hobby or do volunteer work.  Meditate. This can help you relax, because you are not worrying about what happened before or what may happen in the future.  Do guided imagery. Imagine yourself in any setting that helps you feel calm. You can use online videos, books, or a teacher to guide you.  Do breathing exercises. For example:  From a standing position, bend forward from the waist with your knees slightly bent. Let your arms dangle close to the floor.  Breathe in slowly and deeply as you return to a standing position. Roll up slowly and lift your head last.  Hold your breath for just a few seconds in the standing position.  Breathe out slowly and bend forward from the waist.  Let your feelings out. Talk, laugh, cry, and express anger when you need to. Talking with supportive friends or family, a counselor, or a hemant leader about your feelings is a healthy way to relieve stress. Avoid discussing your feelings with people who make you feel worse.  Write. It may help to write about things that are bothering you. This helps you find out how much stress you feel and what is causing it. When you know this, you can find better ways to cope.  What can you do to prevent stress?  You might try some of these things to help prevent stress:  Manage your time. This helps you find time to do the things you want and need to  "do.  Get enough sleep. Your body recovers from the stresses of the day while you are sleeping.  Get support. Your family, friends, and community can make a difference in how you experience stress.  Limit your news feed. Avoid or limit time on social media or news that may make you feel stressed.  Do something active. Exercise or activity can help reduce stress. Walking is a great way to get started.  Where can you learn more?  Go to https://www.[employer].net/patiented  Enter N032 in the search box to learn more about \"Learning About Stress.\"  Current as of: October 24, 2024  Content Version: 14.4    5022-8707 "[employer].   Care instructions adapted under license by your healthcare professional. If you have questions about a medical condition or this instruction, always ask your healthcare professional. "[employer] disclaims any warranty or liability for your use of this information.       "

## 2025-04-24 NOTE — PROGRESS NOTES
"Preventive Care Visit  [facility]  Piper Khan NP, Family Medicine  Apr 24, 2025      Assessment & Plan     (Z00.00) Routine general medical examination at a health care facility  (primary encounter diagnosis)  Comment:   Plan:     (I10) Hypertension, unspecified type  Comment:   Plan: losartan (COZAAR) 25 MG tablet, Basic metabolic        panel  (Ca, Cl, CO2, Creat, Gluc, K, Na, BUN)        Stable    Counseled regarding smoking cessation, he is not interested at this time, last visit was counseled and prescribed Chantix, never started it. 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    Weight as of this encounter: 93.4 kg (206 lb).       Counseling  Appropriate preventive services were addressed with this patient via screening, questionnaire, or discussion as appropriate for fall prevention, nutrition, physical activity, Tobacco-use cessation, social engagement, weight loss and cognition.  Checklist reviewing preventive services available has been given to the patient.  Reviewed patient's diet, addressing concerns and/or questions.   He is at risk for lack of exercise and has been provided with information to increase physical activity for the benefit of his well-being.   Patient is at risk for social isolation and has been provided with information about the benefit of social connection.   The patient was instructed to see the dentist every 6 " months.   He is at risk for psychosocial distress and has been provided with information to reduce risk.           Rajat Christianson is a 59 year old, presenting for the following:    Pacemaker placed last summer.  Still smoking 1/2 ppd  Physical (Recheck and renew meds)        4/24/2025    10:34 AM   Additional Questions   Roomed by ARLETTE Pyle   Accompanied by Self          HPI  Needs med refills         Advance Care Planning            4/24/2025   General Health   How would you rate your overall physical health? Good   Feel stress (tense, anxious, or unable to sleep) To some extent   (!) STRESS CONCERN      4/24/2025   Nutrition   Three or more servings of calcium each day? (!) I DON'T KNOW   Diet: Breakfast skipped   How many servings of fruit and vegetables per day? (!) 0-1   How many sweetened beverages each day? (!) 4+         4/24/2025   Exercise   Days per week of moderate/strenous exercise 1 day   Average minutes spent exercising at this level 0 min   (!) EXERCISE CONCERN      4/24/2025   Social Factors   Frequency of gathering with friends or relatives Never   Worry food won't last until get money to buy more No   Food not last or not have enough money for food? No   Do you have housing? (Housing is defined as stable permanent housing and does not include staying outside in a car, in a tent, in an abandoned building, in an overnight shelter, or couch-surfing.) No   Are you worried about losing your housing? No   Lack of transportation? No   Unable to get utilities (heat,electricity)? No   Want help with housing or utility concern? No   (!) HOUSING CONCERN PRESENT(!) SOCIAL CONNECTIONS CONCERN      4/24/2025   Fall Risk   Fallen 2 or more times in the past year? No   Trouble with walking or balance? No          4/24/2025   Dental   Dentist two times every year? (!) NO         Today's PHQ-2 Score:       4/24/2025    10:39 AM   PHQ-2 ( 1999 [employer])   Q1: Little interest or pleasure in doing things 0   Q2:  "Feeling down, depressed or hopeless 0   PHQ-2 Score 0    Q1: Little interest or pleasure in doing things Not at all   Q2: Feeling down, depressed or hopeless Not at all   PHQ-2 Score 0       Patient-reported           4/24/2025   Substance Use   Alcohol more than 3/day or more than 7/wk Not Applicable   Do you use any other substances recreationally? No     Social History     Tobacco Use    Smoking status: Every Day     Current packs/day: 1.00     Average packs/day: 1 pack/day for 28.0 years (28.0 ttl pk-yrs)     Types: Cigarettes    Smokeless tobacco: Never   Vaping Use    Vaping status: Every Day    Substances: Nicotine    Devices: Refillable tank   Substance Use Topics    Alcohol use: Yes     Comment: occ    Drug use: No           4/24/2025   STI Screening   New sexual partner(s) since last STI/HIV test? No   Last PSA: No results found for: \"PSA\"  ASCVD Risk   The 10-year ASCVD risk score (Larry RODRIGUEZ, et al., 2019) is: 15.7%    Values used to calculate the score:      Age: 59 years      Sex: Male      Is Non- : No      Diabetic: No      Tobacco smoker: Yes      Systolic Blood Pressure: 128 mmHg      Is BP treated: Yes      HDL Cholesterol: 39 mg/dL      Total Cholesterol: 192 mg/dL           Reviewed and updated as needed this visit by Provider                    Lab work is in process         Objective    Exam  /80   Pulse 63   Temp 97.5  F (36.4  C)   Resp 20   Ht 1.778 m (5' 10\")   Wt 93.4 kg (206 lb)   SpO2 98%   BMI 29.56 kg/m     Estimated body mass index is 29.56 kg/m  as calculated from the following:    Height as of this encounter: 1.778 m (5' 10\").    Weight as of this encounter: 93.4 kg (206 lb).    Physical Exam  GENERAL: alert and no distress  EYES: Eyes grossly normal to inspection, PERRL and conjunctivae and sclerae normal  HENT: ear canals and TM's normal, nose and mouth without ulcers or lesions  NECK: no adenopathy, no asymmetry, masses, or " scars  RESP: lungs clear to auscultation - no rales, rhonchi or wheezes  CV: regular rate and rhythm, normal S1 S2, no S3 or S4, no murmur, click or rub, no peripheral edema  ABDOMEN: soft, nontender, no hepatosplenomegaly, no masses and bowel sounds normal  MS: no gross musculoskeletal defects noted, no edema  PSYCH: mentation appears normal, affect normal/bright  LYMPH: no cervical, supraclavicular, axillary, adenopathy        Signed Electronically by: Piper Khan NP